# Patient Record
Sex: FEMALE | Race: BLACK OR AFRICAN AMERICAN | NOT HISPANIC OR LATINO | Employment: OTHER | URBAN - METROPOLITAN AREA
[De-identification: names, ages, dates, MRNs, and addresses within clinical notes are randomized per-mention and may not be internally consistent; named-entity substitution may affect disease eponyms.]

---

## 2017-08-03 ENCOUNTER — OFFICE VISIT (OUTPATIENT)
Dept: HEMATOLOGY/ONCOLOGY | Facility: CLINIC | Age: 57
End: 2017-08-03
Payer: COMMERCIAL

## 2017-08-03 VITALS
RESPIRATION RATE: 20 BRPM | DIASTOLIC BLOOD PRESSURE: 73 MMHG | TEMPERATURE: 98 F | HEART RATE: 72 BPM | WEIGHT: 223 LBS | SYSTOLIC BLOOD PRESSURE: 129 MMHG

## 2017-08-03 DIAGNOSIS — D50.0 CHRONIC BLOOD LOSS ANEMIA: ICD-10-CM

## 2017-08-03 DIAGNOSIS — I63.9 CEREBROVASCULAR ACCIDENT (CVA), UNSPECIFIED MECHANISM: ICD-10-CM

## 2017-08-03 DIAGNOSIS — I82.402 RECURRENT DEEP VEIN THROMBOSIS (DVT) OF LEFT LOWER EXTREMITY: ICD-10-CM

## 2017-08-03 DIAGNOSIS — D56.3 THALASSEMIA TRAIT, ALPHA: ICD-10-CM

## 2017-08-03 DIAGNOSIS — R76.0 ANTICARDIOLIPIN ANTIBODY POSITIVE: ICD-10-CM

## 2017-08-03 PROCEDURE — 99214 OFFICE O/P EST MOD 30 MIN: CPT | Mod: ,,, | Performed by: INTERNAL MEDICINE

## 2017-08-03 RX ORDER — CYANOCOBALAMIN (VITAMIN B-12) 250 MCG
250 TABLET ORAL
COMMUNITY

## 2017-08-03 RX ORDER — LEVOTHYROXINE SODIUM 137 UG/1
1 CAPSULE ORAL
COMMUNITY

## 2017-08-03 RX ORDER — METOPROLOL SUCCINATE 25 MG/1
25 TABLET, EXTENDED RELEASE ORAL
COMMUNITY

## 2017-08-03 RX ORDER — LEVOTHYROXINE SODIUM 150 UG/1
TABLET ORAL
Refills: 1 | COMMUNITY
Start: 2017-06-10

## 2017-08-03 RX ORDER — LEVOTHYROXINE SODIUM 137 UG/1
TABLET ORAL
Refills: 1 | COMMUNITY
Start: 2017-05-30

## 2017-08-03 RX ORDER — LISINOPRIL 5 MG/1
10 TABLET ORAL
COMMUNITY

## 2017-08-03 RX ORDER — CALCITRIOL 0.5 UG/1
0.5 CAPSULE ORAL
COMMUNITY

## 2017-08-03 RX ORDER — ESCITALOPRAM OXALATE 20 MG/1
20 TABLET ORAL
COMMUNITY

## 2017-08-03 RX ORDER — TIZANIDINE 4 MG/1
TABLET ORAL
Refills: 5 | COMMUNITY
Start: 2017-05-30 | End: 2019-11-27

## 2017-08-03 RX ORDER — ROSUVASTATIN CALCIUM 20 MG/1
20 TABLET, COATED ORAL
COMMUNITY

## 2017-08-04 PROBLEM — R76.0 ANTICARDIOLIPIN ANTIBODY POSITIVE: Status: ACTIVE | Noted: 2017-08-04

## 2017-08-04 PROBLEM — I63.9 CVA (CEREBRAL VASCULAR ACCIDENT): Status: ACTIVE | Noted: 2017-08-04

## 2017-08-04 PROBLEM — I82.402 RECURRENT DEEP VEIN THROMBOSIS (DVT) OF LEFT LOWER EXTREMITY: Status: ACTIVE | Noted: 2017-08-04

## 2017-08-04 PROBLEM — D56.3 THALASSEMIA TRAIT, ALPHA: Status: ACTIVE | Noted: 2017-08-04

## 2017-08-04 PROBLEM — D50.0 CHRONIC BLOOD LOSS ANEMIA: Status: ACTIVE | Noted: 2017-08-04

## 2017-08-04 NOTE — PROGRESS NOTES
[facility] History & Physical    Subjective:      Patient ID:   Juana Lazo  57 y.o. female  1960  John Tapia MD      Chief Complaint:   Anemia      HPI:  57 y.o. female with diagnosis of right sided CVA in 2015, she has residual left leg weakness and ambulates with a fall curve.    She has history of left leg DVT ×2. In the past proteins C level was low at 50, she had significant proteinuria, and her anti-cardiolipin antibody was increased supporting hypercoagulation syndrome. She is presently on Xarelto 20 mg by mouth daily and aspirin 81 mg by mouth daily without recent stroke or TIA.    She has history of iron deficiency and has been on oral iron in the past. Oral iron constipated her. Hemoglobin is stable at 11.5. Ferritin is normal at 134, she is also oral iron now.    She also has history of alpha thalassemia.She was due to see Dr. Monge for GI evaluation. However she has not seen him,and has decided to put this off, for now.    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 She is allergic to latex andrubber.    She has history of hypertension, hypercholesterolemia, thyroid disease, DJD,she is status post CVA ×1, and DVT at left leg ×2.    She has history of thyroidectomy in 2005, she is status post partial hysterectomy, and left and right knee replacement. She is a  0 para 0 miscarriage 0 individual.      Her mother has hypertension, diabetes, hypercholesterolemia, and anemia. Her dad had thyroid disease, and DVT and cardiac arrhythmia. A sister with thyroid disease.            ROS:   GEN: normal without any fever, night sweats or weight loss  HEENT: see history of present illness.   no HA's, sore throat, stiff neck, changes in vision  CV: normal with no CP, SOB, PND, CEDEÑO or orthopnea  PULM: normal with no SOB, cough, hemoptysis, sputum or pleuritic pain  GI: normal with no abdominal pain, nausea, vomiting,  constipation, diarrhea, melanotic stools, BRBPR, or hematemesis  : normal with no hematuria, dysuria  BREAST: normal with no mass, discharge, pain  SKIN: normal with no rash, erythema, bruising, or swelling     Past Medical History:   Diagnosis Date    Anemia     Stroke      Past Surgical History:   Procedure Laterality Date    HYSTERECTOMY      KNEE SURGERY         Review of patient's allergies indicates:   Allergen Reactions    Latex, natural rubber      Social History     Social History    Marital status:      Spouse name: N/A    Number of children: N/A    Years of education: N/A     Occupational History    Not on file.     Social History Main Topics    Smoking status: Never Smoker    Smokeless tobacco: Never Used    Alcohol use Yes      Comment: occassional    Drug use: No    Sexual activity: Not on file     Other Topics Concern    Not on file     Social History Narrative    No narrative on file         Current Outpatient Prescriptions:     aspirin-calcium carbonate 81 mg-300 mg calcium(777 mg) Tab, Take 81 mg by mouth., Disp: , Rfl:     calcitRIOL (ROCALTROL) 0.5 MCG Cap, Take 0.5 mcg by mouth., Disp: , Rfl:     cyanocobalamin (VITAMIN B-12) 250 MCG tablet, Take 250 mcg by mouth., Disp: , Rfl:     escitalopram oxalate (LEXAPRO) 20 MG tablet, Take 20 mg by mouth., Disp: , Rfl:     levothyroxine (SYNTHROID) 137 MCG Tab tablet, , Disp: , Rfl: 1    levothyroxine 137 mcg Cap, Take 1 capsule by mouth., Disp: , Rfl:     lisinopril (PRINIVIL,ZESTRIL) 5 MG tablet, Take 5 mg by mouth., Disp: , Rfl:     metoprolol succinate (TOPROL-XL) 25 MG 24 hr tablet, Take 25 mg by mouth., Disp: , Rfl:     rivaroxaban (XARELTO) 20 mg Tab, Take 20 mg by mouth., Disp: , Rfl:     rosuvastatin (CRESTOR) 20 MG tablet, Take 20 mg by mouth., Disp: , Rfl:     tizanidine (ZANAFLEX) 4 MG tablet, , Disp: , Rfl: 5    levothyroxine (SYNTHROID) 150 MCG tablet, , Disp: , Rfl: 1          Objective:    Vitals:  Blood pressure 129/73, pulse 72, temperature 97.8 °F (36.6 °C), resp. rate 20, weight 101.2 kg (223 lb).    Physical Examination:   GEN: no apparent distress, comfortable; AAOx3  HEAD: atraumatic and normocephalic  EYES: no pallor, no icterus, PERRLA  ENT: OMM, no pharyngeal erythema, external ears WNL; no nasal discharge; no thrush  NECK: no masses, thyroid normal, trachea midline, no LAD/LN's, supple  CV: RRR with no murmur; normal pulse; normal S1 and S2; no pedal edema  CHEST: Normal respiratory effort; CTAB; normal breath sounds; no wheeze or crackles  ABDOM: nontender and nondistended; soft; normal bowel sounds; no rebound/guarding  MUSC/Skeletal: She has right leg weakness.she is able to ambulate with a shuffling gait with the aid of a walker.  EXTREM: no clubbing, cyanosis, inflammation or swelling  SKIN: no rashes, lesions, ulcers, petechiae   : no telles  NEURO: she has residual right-sided weakness.  She is able to ambulate with a shuffling gait, with the aid of a walker  PSYCH: normal mood, affect and behavior  LYMPH: normal cervical, supraclavicular, axillary and groin LN's      Labs:   No results found for: WBC, HGB, HCT, MCV, PLT CMP  No results found for: NA, K, CL, CO2, GLU, BUN, CREATININE, CALCIUM, PROT, ALBUMIN, BILITOT, ALKPHOS, AST, ALT, ANIONGAP, ESTGFRAFRICA, EGFRNONAA  I have reviewed all available lab results and radiology reports.    Radiology/Diagnostic Studies:        All lab results and imaging results have been reviewed and discussed with the patient    Assessment:   (1) 57 y.o. female with diagnosis of right sided CVA, recurrent left leg DVT ×2, borderline low protein C with proteinuria andincreased anti-cardiolipin antibodies. She has hypercoagulation syndrome. She appears stable on Xarelto 20 mg by mouth daily and aspirin 81 mg by mouth daily.return to clinic in 6 months.    (2)She. has history of iron deficiency, she is off iron now, at this time.    (3)she has history  of alpha thalassemia.          1. Chronic blood loss anemia    2. Cerebrovascular accident (CVA), unspecified mechanism    3. Thalassemia trait, alpha    4. Recurrent deep vein thrombosis (DVT) of left lower extremity    5. Anticardiolipin antibody positive        Plan:       Chronic blood loss anemia    Cerebrovascular accident (CVA), unspecified mechanism    Thalassemia trait, alpha    Recurrent deep vein thrombosis (DVT) of left lower extremity    Anticardiolipin antibody positive        I have explained and the patient understands all of  the current recommendation(s). I have answered all of their questions to the best of my ability and to their complete satisfaction.     20 minutes were spent interviewing, examining, and going oversummary of medical situation with her.  20 minutes was spent placing a summary of the medical history from the paper chart into the medical record and a summary of the interview, physical examination and summary of CVA event, DVT event, and hypercoagulation syndrome findings into the electronic medical record.  Total time spent on patient care with this visit was 40 minutes

## 2018-02-21 LAB
BASOPHILS # BLD AUTO: 29 CELLS/UL (ref 0–200)
BASOPHILS NFR BLD AUTO: 0.6 %
EOSINOPHIL # BLD AUTO: 58 CELLS/UL (ref 15–500)
EOSINOPHIL NFR BLD AUTO: 1.2 %
ERYTHROCYTE [DISTWIDTH] IN BLOOD BY AUTOMATED COUNT: 13.5 % (ref 11–15)
FERRITIN SERPL-MCNC: 89 NG/ML (ref 10–232)
HCT VFR BLD AUTO: 32.9 % (ref 35–45)
HGB BLD-MCNC: 10.4 G/DL (ref 11.7–15.5)
LYMPHOCYTES # BLD AUTO: 1901 CELLS/UL (ref 850–3900)
LYMPHOCYTES NFR BLD AUTO: 39.6 %
MCH RBC QN AUTO: 25.1 PG (ref 27–33)
MCHC RBC AUTO-ENTMCNC: 31.6 G/DL (ref 32–36)
MCV RBC AUTO: 79.5 FL (ref 80–100)
MONOCYTES # BLD AUTO: 418 CELLS/UL (ref 200–950)
MONOCYTES NFR BLD AUTO: 8.7 %
NEUTROPHILS # BLD AUTO: 2395 CELLS/UL (ref 1500–7800)
NEUTROPHILS NFR BLD AUTO: 49.9 %
PLATELET # BLD AUTO: 247 THOUSAND/UL (ref 140–400)
PMV BLD REES-ECKER: 10.8 FL (ref 7.5–12.5)
RBC # BLD AUTO: 4.14 MILLION/UL (ref 3.8–5.1)
WBC # BLD AUTO: 4.8 THOUSAND/UL (ref 3.8–10.8)

## 2018-02-28 ENCOUNTER — OFFICE VISIT (OUTPATIENT)
Dept: HEMATOLOGY/ONCOLOGY | Facility: CLINIC | Age: 58
End: 2018-02-28
Payer: MEDICARE

## 2018-02-28 VITALS
WEIGHT: 254 LBS | TEMPERATURE: 98 F | RESPIRATION RATE: 20 BRPM | SYSTOLIC BLOOD PRESSURE: 174 MMHG | HEART RATE: 61 BPM | DIASTOLIC BLOOD PRESSURE: 77 MMHG

## 2018-02-28 DIAGNOSIS — I82.402 RECURRENT DEEP VEIN THROMBOSIS (DVT) OF LEFT LOWER EXTREMITY: ICD-10-CM

## 2018-02-28 DIAGNOSIS — D56.3 THALASSEMIA TRAIT, ALPHA: ICD-10-CM

## 2018-02-28 DIAGNOSIS — D50.0 CHRONIC BLOOD LOSS ANEMIA: ICD-10-CM

## 2018-02-28 DIAGNOSIS — R76.0 ANTICARDIOLIPIN ANTIBODY POSITIVE: ICD-10-CM

## 2018-02-28 DIAGNOSIS — I63.89 CEREBROVASCULAR ACCIDENT (CVA) DUE TO OTHER MECHANISM: Primary | ICD-10-CM

## 2018-02-28 PROCEDURE — 99214 OFFICE O/P EST MOD 30 MIN: CPT | Mod: ,,, | Performed by: INTERNAL MEDICINE

## 2018-02-28 NOTE — PROGRESS NOTES
[facility] History & Physical    Subjective:      Patient ID:   Juana Lazo  57 y.o. female  1960  John Tapia MD      Chief Complaint:   thal hx    HPI:  57 y.o. female with diagnosis of right sided CVA in 2015, she has residual left leg weakness and ambulates with a fall curve.    She has history of left leg DVT ×2. In the past proteins C level was low at 50, she had significant proteinuria, and her anti-cardiolipin antibody was increased supporting hypercoagulation syndrome. She is presently on Xarelto 20 mg by mouth daily and aspirin 81 mg by mouth daily without recent stroke or TIA.    She has history of iron deficiency and has been on oral iron in the past. Oral iron constipated her. Hemoglobin is stable at 11.5. Ferritin is normal at 134, she is also oral iron now.    She also has history of alpha thalassemia.  She saw Dr. Monge, colonoscopy, polyps removed 2018.    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 She is allergic to latex andrubber.    She has history of hypertension, hypercholesterolemia, thyroid disease, DJD,she is status post CVA ×1, and DVT at left leg ×2.    She has history of thyroidectomy in 2005, she is status post partial hysterectomy, and left and right knee replacement. She is a  0 para 0 miscarriage 0 individual.    Ambulation with walker is better, she is on PT.  Took bus trip with group to [facility], [place], [place].  Did fine.      Her mother has hypertension, diabetes, hypercholesterolemia, and anemia. Her dad had thyroid disease, and DVT and cardiac arrhythmia. A sister with thyroid disease.      ROS:   GEN: normal without any fever, night sweats or weight loss  HEENT: see history of present illness.   no HA's, sore throat, stiff neck, changes in vision  CV: normal with no CP, SOB, PND, CEDEÑO or orthopnea  PULM: normal with no SOB, cough, hemoptysis, sputum or pleuritic  pain  GI: normal with no abdominal pain, nausea, vomiting, constipation, diarrhea, melanotic stools, BRBPR, or hematemesis  : normal with no hematuria, dysuria  BREAST: normal with no mass, discharge, pain  SKIN: normal with no rash, erythema, bruising, or swelling     Past Medical History:   Diagnosis Date    Anemia     Stroke      Past Surgical History:   Procedure Laterality Date    HYSTERECTOMY      KNEE SURGERY         Review of patient's allergies indicates:   Allergen Reactions    Latex, natural rubber      Social History     Social History    Marital status:      Spouse name: N/A    Number of children: N/A    Years of education: N/A     Occupational History    Not on file.     Social History Main Topics    Smoking status: Never Smoker    Smokeless tobacco: Never Used    Alcohol use Yes      Comment: occassional    Drug use: No    Sexual activity: Not on file     Other Topics Concern    Not on file     Social History Narrative    No narrative on file         Current Outpatient Prescriptions:     aspirin-calcium carbonate 81 mg-300 mg calcium(777 mg) Tab, Take 81 mg by mouth., Disp: , Rfl:     calcitRIOL (ROCALTROL) 0.5 MCG Cap, Take 0.5 mcg by mouth., Disp: , Rfl:     cyanocobalamin (VITAMIN B-12) 250 MCG tablet, Take 250 mcg by mouth., Disp: , Rfl:     escitalopram oxalate (LEXAPRO) 20 MG tablet, Take 20 mg by mouth., Disp: , Rfl:     levothyroxine (SYNTHROID) 137 MCG Tab tablet, , Disp: , Rfl: 1    levothyroxine (SYNTHROID) 150 MCG tablet, , Disp: , Rfl: 1    levothyroxine 137 mcg Cap, Take 1 capsule by mouth., Disp: , Rfl:     lisinopril (PRINIVIL,ZESTRIL) 5 MG tablet, Take 5 mg by mouth., Disp: , Rfl:     metoprolol succinate (TOPROL-XL) 25 MG 24 hr tablet, Take 25 mg by mouth., Disp: , Rfl:     rivaroxaban (XARELTO) 20 mg Tab, Take 20 mg by mouth., Disp: , Rfl:     rosuvastatin (CRESTOR) 20 MG tablet, Take 20 mg by mouth., Disp: , Rfl:     tizanidine (ZANAFLEX) 4 MG  tablet, , Disp: , Rfl: 5          Objective:   Vitals:  There were no vitals taken for this visit.    Physical Examination:   GEN: no apparent distress, comfortable  HEAD: atraumatic and normocephalic  EYES: no pallor, no icterus  ENT:  no pharyngeal erythema, external ears WNL; no nasal discharge  NECK: no masses, thyroid normal, trachea midline, no LAD/LN's, supple  CV: RRR with no murmur; normal pulse; normal S1 and S2; no pedal edema  CHEST: Normal respiratory effort; CTAB; normal breath sounds; no wheeze or crackles  ABDOM: nontender and nondistended; soft; normal bowel sounds; no rebound/guarding  MUSC/Skeletal: She has right leg weakness.she is able to ambulate with a shuffling gait with the aid of a walker.  EXTREM: no clubbing, cyanosis, inflammation or swelling  SKIN: no rashes, lesions, ulcers, petechiae   : no telles  NEURO: she has residual right-sided weakness.  She is able to ambulate with a shuffling gait, with the aid of a walker  PSYCH: normal mood, affect and behavior  LYMPH: normal cervical, supraclavicular, axillary and groin LN's      Labs:   Lab Results   Component Value Date    WBC 4.8 02/20/2018    HGB 10.4 (L) 02/20/2018    HCT 32.9 (L) 02/20/2018    MCV 79.5 (L) 02/20/2018     02/20/2018    .    Radiology/Diagnostic Studies:    Mamm 9/20/17 negative for malignancy  Ferritin 134.  Hgb elect. Supports thal trait.        Assessment:   (1) 57 y.o. female with diagnosis of right sided CVA, recurrent left leg DVT ×2, borderline low protein C with proteinuria andincreased anti-cardiolipin antibodies. She has hypercoagulation syndrome. She appears stable on Xarelto 20 mg by mouth daily and aspirin 81 mg by mouth daily.return to clinic in 6 months.    (2)She. has history of iron deficiency, she is off iron now, at this time.    (3)she has history of alpha thalassemia.

## 2018-08-27 ENCOUNTER — TELEPHONE (OUTPATIENT)
Dept: HEMATOLOGY/ONCOLOGY | Facility: CLINIC | Age: 58
End: 2018-08-27

## 2018-08-27 DIAGNOSIS — D50.0 ANEMIA DUE TO CHRONIC BLOOD LOSS: Primary | ICD-10-CM

## 2018-08-29 ENCOUNTER — OFFICE VISIT (OUTPATIENT)
Dept: HEMATOLOGY/ONCOLOGY | Facility: CLINIC | Age: 58
End: 2018-08-29
Payer: MEDICARE

## 2018-08-29 VITALS
SYSTOLIC BLOOD PRESSURE: 122 MMHG | BODY MASS INDEX: 46.74 KG/M2 | TEMPERATURE: 98 F | HEART RATE: 72 BPM | DIASTOLIC BLOOD PRESSURE: 74 MMHG | HEIGHT: 62 IN | WEIGHT: 254 LBS

## 2018-08-29 DIAGNOSIS — R76.0 ANTICARDIOLIPIN ANTIBODY POSITIVE: Primary | ICD-10-CM

## 2018-08-29 DIAGNOSIS — D56.3 THALASSEMIA ALPHA CARRIER: ICD-10-CM

## 2018-08-29 DIAGNOSIS — I63.89: ICD-10-CM

## 2018-08-29 LAB
BASOPHILS # BLD AUTO: 32 CELLS/UL (ref 0–200)
BASOPHILS NFR BLD AUTO: 0.6 %
EOSINOPHIL # BLD AUTO: 111 CELLS/UL (ref 15–500)
EOSINOPHIL NFR BLD AUTO: 2.1 %
ERYTHROCYTE [DISTWIDTH] IN BLOOD BY AUTOMATED COUNT: 14.1 % (ref 11–15)
FERRITIN SERPL-MCNC: 112 NG/ML (ref 10–232)
HCT VFR BLD AUTO: 33.5 % (ref 35–45)
HGB BLD-MCNC: 10.9 G/DL (ref 11.7–15.5)
LYMPHOCYTES # BLD AUTO: 2062 CELLS/UL (ref 850–3900)
LYMPHOCYTES NFR BLD AUTO: 38.9 %
MCH RBC QN AUTO: 25.5 PG (ref 27–33)
MCHC RBC AUTO-ENTMCNC: 32.5 G/DL (ref 32–36)
MCV RBC AUTO: 78.5 FL (ref 80–100)
MONOCYTES # BLD AUTO: 334 CELLS/UL (ref 200–950)
MONOCYTES NFR BLD AUTO: 6.3 %
NEUTROPHILS # BLD AUTO: 2761 CELLS/UL (ref 1500–7800)
NEUTROPHILS NFR BLD AUTO: 52.1 %
PLATELET # BLD AUTO: 246 THOUSAND/UL (ref 140–400)
PMV BLD REES-ECKER: 10.7 FL (ref 7.5–12.5)
RBC # BLD AUTO: 4.27 MILLION/UL (ref 3.8–5.1)
WBC # BLD AUTO: 5.3 THOUSAND/UL (ref 3.8–10.8)

## 2018-08-29 PROCEDURE — 99214 OFFICE O/P EST MOD 30 MIN: CPT | Mod: ,,, | Performed by: INTERNAL MEDICINE

## 2018-08-29 NOTE — LETTER
August 29, 2018      John Tapia MD           [facility]  [address] [address]  [place] [address]  Phone: [phone]  Fax: [phone]          Patient: Juana Lazo   MR Number: [id number]   YOB: 1960   Date of Visit: 8/29/2018       Dear Dr. John Tapia:    Thank you for referring Juana Lazo to me for evaluation. Attached you will find relevant portions of my assessment and plan of care.    If you have questions, please do not hesitate to call me. I look forward to following Juana Lazo along with you.    Sincerely,    RADHA Sweeney MD    Enclosure  CC:  No Recipients    If you would like to receive this communication electronically, please contact [email or web address] or [phone] to request more information on [employer] Link access.    For providers and/or their staff who would like to refer a patient to [facility], please contact us through our one-stop-shop provider referral line, [facility] , at [phone].    If you feel you have received this communication in error or would no longer like to receive these types of communications, please e-mail [email or web address]

## 2018-08-29 NOTE — PROGRESS NOTES
[facility] History & Physical    Subjective:      Patient ID:   Juana Lazo  58 y.o. female  1960  John Tapia MD      Chief Complaint:   thal hx    HPI:  58 y.o. female with diagnosis of right sided CVA in 2015, she has residual left leg weakness and ambulates with a walker.  On PT BIW.  On Xarelto 20mg, ASA 81mg daily.  No further clot events or CVA.    She has history of left leg DVT ×2. In the past proteins C level was low at 50, she had significant proteinuria, and her anti-cardiolipin antibody was increased supporting hypercoagulation syndrome. She is presently on Xarelto 20 mg by mouth daily and aspirin 81 mg by mouth daily without recent stroke or TIA.    She has history of iron deficiency and has been on oral iron in the past. Oral iron constipated her.  She is also off oral iron now.  Recent labs show Hgb 10.9 and ferritin 111.    She also has history of alpha thalassemia.  She saw Dr. Monge, colonoscopy, polyps removed 2018.    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 She is allergic to latex andrubber.    She has history of hypertension, hypercholesterolemia, thyroid disease, DJD,she is status post CVA ×1, and DVT at left leg ×2.    She has history of thyroidectomy in 2005, she is status post partial hysterectomy, and left and right knee replacement. She is a  0 para 0 miscarriage 0 individual.    Ambulation with walker is better, she is on PT.  Took bus trip with group to [employer], [place], [place].  Did fine.      Her mother has hypertension, diabetes, hypercholesterolemia, and anemia. Her dad had thyroid disease, and DVT and cardiac arrhythmia. A sister with thyroid disease.      ROS:   GEN: normal without any fever, night sweats or weight loss  HEENT: see history of present illness.   no HA's, sore throat, stiff neck, changes in vision  CV: normal with no CP, SOB, PND, CEDEÑO or  orthopnea  PULM: normal with no SOB, cough, hemoptysis, sputum or pleuritic pain  GI: normal with no abdominal pain, nausea, vomiting, constipation, diarrhea, melanotic stools, BRBPR, or hematemesis  : normal with no hematuria, dysuria  BREAST: normal with no mass, discharge, pain  SKIN: normal with no rash, erythema, bruising, or swelling     Past Medical History:   Diagnosis Date    Anemia     Stroke      Past Surgical History:   Procedure Laterality Date    HYSTERECTOMY      KNEE SURGERY         Review of patient's allergies indicates:   Allergen Reactions    Latex, natural rubber      Social History     Socioeconomic History    Marital status:      Spouse name: Not on file    Number of children: Not on file    Years of education: Not on file    Highest education level: Not on file   Social Needs    Financial resource strain: Not on file    Food insecurity - worry: Not on file    Food insecurity - inability: Not on file    Transportation needs - medical: Not on file    Transportation needs - non-medical: Not on file   Occupational History    Not on file   Tobacco Use    Smoking status: Never Smoker    Smokeless tobacco: Never Used   Substance and Sexual Activity    Alcohol use: Yes     Comment: occassional    Drug use: No    Sexual activity: Not on file   Other Topics Concern    Not on file   Social History Narrative    Not on file         Current Outpatient Medications:     aspirin-calcium carbonate 81 mg-300 mg calcium(777 mg) Tab, Take 81 mg by mouth., Disp: , Rfl:     calcitRIOL (ROCALTROL) 0.5 MCG Cap, Take 0.5 mcg by mouth., Disp: , Rfl:     cyanocobalamin (VITAMIN B-12) 250 MCG tablet, Take 250 mcg by mouth., Disp: , Rfl:     escitalopram oxalate (LEXAPRO) 20 MG tablet, Take 20 mg by mouth., Disp: , Rfl:     levothyroxine (SYNTHROID) 137 MCG Tab tablet, , Disp: , Rfl: 1    levothyroxine (SYNTHROID) 150 MCG tablet, , Disp: , Rfl: 1    levothyroxine 137 mcg Cap, Take 1  "capsule by mouth., Disp: , Rfl:     lisinopril (PRINIVIL,ZESTRIL) 5 MG tablet, Take 10 mg by mouth. , Disp: , Rfl:     metoprolol succinate (TOPROL-XL) 25 MG 24 hr tablet, Take 25 mg by mouth., Disp: , Rfl:     rivaroxaban (XARELTO) 20 mg Tab, Take 20 mg by mouth., Disp: , Rfl:     rosuvastatin (CRESTOR) 20 MG tablet, Take 20 mg by mouth., Disp: , Rfl:     tizanidine (ZANAFLEX) 4 MG tablet, , Disp: , Rfl: 5          Objective:   Vitals:  Blood pressure 122/74, pulse 72, temperature 98.4 °F (36.9 °C), height 5' 2" (1.575 m), weight 115.2 kg (254 lb).    Physical Examination:   GEN: no apparent distress, comfortable  HEAD: atraumatic and normocephalic  EYES: no pallor, no icterus  ENT:  no pharyngeal erythema, external ears WNL; no nasal discharge  NECK: no masses, thyroid normal, trachea midline, no LAD/LN's, supple  CV: RRR with no murmur; normal pulse; normal S1 and S2; no pedal edema  CHEST: Normal respiratory effort; CTAB; normal breath sounds; no wheeze or crackles  ABDOM: nontender and nondistended; soft; normal bowel sounds; no rebound/guarding  MUSC/Skeletal: She has right leg weakness.she is able to ambulate with a shuffling gait with the aid of a walker.  EXTREM: no clubbing, cyanosis, inflammation or swelling  SKIN: no rashes, lesions, ulcers, petechiae   : no telles  NEURO: she has residual right-sided weakness.  She is able to ambulate with a shuffling gait, with the aid of a walker  PSYCH: normal mood, affect and behavior  LYMPH: normal cervical, supraclavicular, axillary and groin LN's      Labs:   Lab Results   Component Value Date    WBC 5.3 08/28/2018    HGB 10.9 (L) 08/28/2018    HCT 33.5 (L) 08/28/2018    MCV 78.5 (L) 08/28/2018     08/28/2018    .    Radiology/Diagnostic Studies:    Mamm 9/20/17 negative for malignancy  Ferritin 111.  Hgb 10.9  Hgb elect. Supports thal trait.        Assessment:   (1) 58 y.o. female with diagnosis of right sided CVA, recurrent left leg DVT ×2, " borderline low protein C with proteinuria and increased anti-cardiolipin antibodies. She has hypercoagulation syndrome. She appears stable on Xarelto 20 mg by mouth daily and aspirin 81 mg by mouth daily.  Return to clinic in 6 months.    (2)She. has history of iron deficiency, she is off iron now, at this time.  Iron stores are NL now.    (3)she has history of alpha thalassemia.    Lupus anticoagulant, Anti cardiolipin Antibody, CBC Dr. Tapia 2/2019.    RTC 2/2019.

## 2019-04-29 ENCOUNTER — TELEPHONE (OUTPATIENT)
Dept: HEMATOLOGY/ONCOLOGY | Facility: CLINIC | Age: 59
End: 2019-04-29

## 2019-04-29 DIAGNOSIS — D51.8 ANEMIA OF DECREASED VITAMIN B12 ABSORPTION: Primary | ICD-10-CM

## 2019-04-30 LAB
BASOPHILS # BLD AUTO: 32 CELLS/UL (ref 0–200)
BASOPHILS NFR BLD AUTO: 0.6 %
EOSINOPHIL # BLD AUTO: 101 CELLS/UL (ref 15–500)
EOSINOPHIL NFR BLD AUTO: 1.9 %
ERYTHROCYTE [DISTWIDTH] IN BLOOD BY AUTOMATED COUNT: 13.8 % (ref 11–15)
FERRITIN SERPL-MCNC: 94 NG/ML (ref 10–232)
HCT VFR BLD AUTO: 35.6 % (ref 35–45)
HGB BLD-MCNC: 10.9 G/DL (ref 11.7–15.5)
LYMPHOCYTES # BLD AUTO: 1829 CELLS/UL (ref 850–3900)
LYMPHOCYTES NFR BLD AUTO: 34.5 %
MCH RBC QN AUTO: 24.7 PG (ref 27–33)
MCHC RBC AUTO-ENTMCNC: 30.6 G/DL (ref 32–36)
MCV RBC AUTO: 80.7 FL (ref 80–100)
MONOCYTES # BLD AUTO: 318 CELLS/UL (ref 200–950)
MONOCYTES NFR BLD AUTO: 6 %
NEUTROPHILS # BLD AUTO: 3021 CELLS/UL (ref 1500–7800)
NEUTROPHILS NFR BLD AUTO: 57 %
PLATELET # BLD AUTO: 276 THOUSAND/UL (ref 140–400)
PMV BLD REES-ECKER: 10.8 FL (ref 7.5–12.5)
RBC # BLD AUTO: 4.41 MILLION/UL (ref 3.8–5.1)
WBC # BLD AUTO: 5.3 THOUSAND/UL (ref 3.8–10.8)

## 2019-05-02 ENCOUNTER — OFFICE VISIT (OUTPATIENT)
Dept: HEMATOLOGY/ONCOLOGY | Facility: CLINIC | Age: 59
End: 2019-05-02
Payer: MEDICARE

## 2019-05-02 VITALS
HEART RATE: 75 BPM | WEIGHT: 261 LBS | DIASTOLIC BLOOD PRESSURE: 65 MMHG | TEMPERATURE: 98 F | BODY MASS INDEX: 47.74 KG/M2 | RESPIRATION RATE: 20 BRPM | SYSTOLIC BLOOD PRESSURE: 131 MMHG

## 2019-05-02 DIAGNOSIS — D56.3 THALASSEMIA ALPHA CARRIER: Primary | ICD-10-CM

## 2019-05-02 PROCEDURE — 99214 PR OFFICE/OUTPT VISIT, EST, LEVL IV, 30-39 MIN: ICD-10-PCS | Mod: ,,, | Performed by: INTERNAL MEDICINE

## 2019-05-02 PROCEDURE — 99214 OFFICE O/P EST MOD 30 MIN: CPT | Mod: ,,, | Performed by: INTERNAL MEDICINE

## 2019-05-02 NOTE — LETTER
May 5, 2019      John Tapia MD  [address]  [address]  [place] [address]           [facility] Hematology Oncology  [address]  [place] [address]  Phone: [phone]  Fax: [phone]          Patient: Juana Lazo   MR Number: [id number]   YOB: 1960   Date of Visit: 5/2/2019       Dear Dr. John Tapia:    Thank you for referring Juana Lazo to me for evaluation. Attached you will find relevant portions of my assessment and plan of care.    If you have questions, please do not hesitate to call me. I look forward to following Juana Lazo along with you.    Sincerely,    RADHA Sweeney MD    Enclosure  CC:  No Recipients    If you would like to receive this communication electronically, please contact [email or web address] or [phone] to request more information on [employer] Link access.    For providers and/or their staff who would like to refer a patient to [facility], please contact us through our one-stop-shop provider referral line, [facility], at [phone].    If you feel you have received this communication in error or would no longer like to receive these types of communications, please e-mail [email or web address]

## 2019-05-06 NOTE — PROGRESS NOTES
[facility] History & Physical    Subjective:      Patient ID:   Juana Lazo  58 y.o. female  1960  John Tapia MD      Chief Complaint:   thal hx    HPI:  58 y.o. female with diagnosis of right sided CVA in 2015, she has residual left leg weakness and ambulates with a walker.  On PT BIW.  Had L leg DVT.  On Xarelto 20mg, ASA 81mg daily.  No further clot events or CVA.    She has history of left leg DVT ×2. In the past proteins C level was low at 50, she had significant proteinuria, and her anti-cardiolipin antibody was increased supporting hypercoagulation syndrome. She is presently on Xarelto 20 mg by mouth daily and aspirin 81 mg by mouth daily without recent stroke or TIA.    She has history of iron deficiency and has been on oral iron in the past. Oral iron constipated her.  She is also off oral iron now.  Recent labs show Hgb 10.9 and ferritin 94.    She also has history of alpha thalassemia.  She saw Dr. Monge, colonoscopy, polyps removed 2018.    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 She is allergic to latex andrubber.    She has history of hypertension, hypercholesterolemia, thyroid disease, DJD,she is status post CVA ×1, and DVT at left leg ×2.    She has history of thyroidectomy in 2005, she is status post partial hysterectomy, and left and right knee replacement. She is a  0 para 0 miscarriage 0 individual.    Ambulation with walker is better, she is on PT.  Took bus trip with group to [facility], [place], [place].  Did fine.    Dr. Gomez to see her for ankle sx.      Her mother has hypertension, diabetes, hypercholesterolemia, and anemia. Her dad had thyroid disease, and DVT and cardiac arrhythmia. A sister with thyroid disease.      ROS:   GEN: normal without any fever, night sweats or weight loss  HEENT: see history of present illness.   no HA's, sore throat, stiff neck, changes in  vision  CV: normal with no CP, SOB, PND, CEDEÑO or orthopnea  PULM: normal with no SOB, cough, hemoptysis, sputum or pleuritic pain  GI: normal with no abdominal pain, nausea, vomiting, constipation, diarrhea, melanotic stools, BRBPR, or hematemesis  : normal with no hematuria, dysuria  BREAST: normal with no mass, discharge, pain  SKIN: normal with no rash, erythema, bruising, or swelling     Past Medical History:   Diagnosis Date    Anemia     Stroke      Past Surgical History:   Procedure Laterality Date    HYSTERECTOMY      KNEE SURGERY         Review of patient's allergies indicates:   Allergen Reactions    Latex, natural rubber      Social History     Socioeconomic History    Marital status:      Spouse name: Not on file    Number of children: Not on file    Years of education: Not on file    Highest education level: Not on file   Occupational History    Not on file   Social Needs    Financial resource strain: Not on file    Food insecurity:     Worry: Not on file     Inability: Not on file    Transportation needs:     Medical: Not on file     Non-medical: Not on file   Tobacco Use    Smoking status: Never Smoker    Smokeless tobacco: Never Used   Substance and Sexual Activity    Alcohol use: Yes     Comment: occassional    Drug use: No    Sexual activity: Not on file   Lifestyle    Physical activity:     Days per week: Not on file     Minutes per session: Not on file    Stress: Not on file   Relationships    Social connections:     Talks on phone: Not on file     Gets together: Not on file     Attends Jain service: Not on file     Active member of club or organization: Not on file     Attends meetings of clubs or organizations: Not on file     Relationship status: Not on file   Other Topics Concern    Not on file   Social History Narrative    Not on file         Current Outpatient Medications:     aspirin-calcium carbonate 81 mg-300 mg calcium(777 mg) Tab, Take 81 mg by  mouth., Disp: , Rfl:     calcitRIOL (ROCALTROL) 0.5 MCG Cap, Take 0.5 mcg by mouth., Disp: , Rfl:     cyanocobalamin (VITAMIN B-12) 250 MCG tablet, Take 250 mcg by mouth., Disp: , Rfl:     escitalopram oxalate (LEXAPRO) 20 MG tablet, Take 20 mg by mouth., Disp: , Rfl:     levothyroxine (SYNTHROID) 137 MCG Tab tablet, , Disp: , Rfl: 1    levothyroxine (SYNTHROID) 150 MCG tablet, , Disp: , Rfl: 1    levothyroxine 137 mcg Cap, Take 1 capsule by mouth., Disp: , Rfl:     lisinopril (PRINIVIL,ZESTRIL) 5 MG tablet, Take 10 mg by mouth. , Disp: , Rfl:     metoprolol succinate (TOPROL-XL) 25 MG 24 hr tablet, Take 25 mg by mouth., Disp: , Rfl:     rivaroxaban (XARELTO) 20 mg Tab, Take 20 mg by mouth., Disp: , Rfl:     rosuvastatin (CRESTOR) 20 MG tablet, Take 20 mg by mouth., Disp: , Rfl:     tizanidine (ZANAFLEX) 4 MG tablet, , Disp: , Rfl: 5          Objective:   Vitals:  Blood pressure 131/65, pulse 75, temperature 98.3 °F (36.8 °C), resp. rate 20, weight 118.4 kg (261 lb).    Physical Examination:   GEN: no apparent distress, comfortable  HEAD: atraumatic and normocephalic  EYES: no pallor, no icterus  ENT:  no pharyngeal erythema, external ears WNL; no nasal discharge  NECK: no masses, thyroid normal, trachea midline, no LAD/LN's, supple  CV: RRR with no murmur; normal pulse; normal S1 and S2; no pedal edema  CHEST: Normal respiratory effort; CTAB; normal breath sounds; no wheeze or crackles  ABDOM: nontender and nondistended; soft; normal bowel sounds; no rebound/guarding  MUSC/Skeletal: She has right leg weakness.she is able to ambulate with a shuffling gait with the aid of a walker.  EXTREM: no clubbing, cyanosis, inflammation or swelling  SKIN: no rashes, lesions, ulcers, petechiae   : no telles  NEURO: she has residual right-sided weakness.  She is able to ambulate with a shuffling gait, with the aid of a walker  PSYCH: normal mood, affect and behavior  LYMPH: normal cervical, supraclavicular, axillary  and groin LN's      Labs:   Lab Results   Component Value Date    WBC 5.3 04/29/2019    HGB 10.9 (L) 04/29/2019    HCT 35.6 04/29/2019    MCV 80.7 04/29/2019     04/29/2019    .    Radiology/Diagnostic Studies:    Mamm 9/20/17 negative for malignancy  Ferritin 94.  Hgb 10.9  Hgb elect. Supports thal trait.        Assessment:   (1) 58 y.o. female with diagnosis of right sided CVA, recurrent left leg DVT ×2, borderline low protein C with proteinuria and increased anti-cardiolipin antibodies. She has hypercoagulation syndrome. She appears stable on Xarelto 20 mg by mouth daily and aspirin 81 mg by mouth daily.  Return to clinic in 6 months.    (2)She. has history of iron deficiency, she is off iron now, at this time.  Iron stores are NL now.    (3)she has history of alpha thalassemia.    RTC 6 months.

## 2019-11-12 ENCOUNTER — TELEPHONE (OUTPATIENT)
Dept: HEMATOLOGY/ONCOLOGY | Facility: CLINIC | Age: 59
End: 2019-11-12

## 2019-11-12 NOTE — TELEPHONE ENCOUNTER
Called to see if patient completed blood work and patient wants to R/S her and her mothers appt. Kelly stated that she will call Mrs rAias tomorrow to reschedule.

## 2019-11-27 ENCOUNTER — OFFICE VISIT (OUTPATIENT)
Dept: HEMATOLOGY/ONCOLOGY | Facility: CLINIC | Age: 59
End: 2019-11-27
Payer: MEDICARE

## 2019-11-27 VITALS
WEIGHT: 263.19 LBS | BODY MASS INDEX: 48.14 KG/M2 | SYSTOLIC BLOOD PRESSURE: 141 MMHG | DIASTOLIC BLOOD PRESSURE: 77 MMHG | TEMPERATURE: 98 F | HEART RATE: 69 BPM | OXYGEN SATURATION: 92 %

## 2019-11-27 DIAGNOSIS — I82.402 RECURRENT DEEP VEIN THROMBOSIS (DVT) OF LEFT LOWER EXTREMITY: Primary | ICD-10-CM

## 2019-11-27 DIAGNOSIS — I63.89 CEREBROVASCULAR ACCIDENT (CVA) DUE TO OTHER MECHANISM: ICD-10-CM

## 2019-11-27 DIAGNOSIS — Z12.31 BREAST CANCER SCREENING BY MAMMOGRAM: ICD-10-CM

## 2019-11-27 DIAGNOSIS — E89.0 POSTOPERATIVE HYPOTHYROIDISM: ICD-10-CM

## 2019-11-27 DIAGNOSIS — D50.0 CHRONIC BLOOD LOSS ANEMIA: ICD-10-CM

## 2019-11-27 PROCEDURE — 99214 OFFICE O/P EST MOD 30 MIN: CPT | Mod: S$GLB,,, | Performed by: INTERNAL MEDICINE

## 2019-11-27 PROCEDURE — 99214 PR OFFICE/OUTPT VISIT, EST, LEVL IV, 30-39 MIN: ICD-10-PCS | Mod: S$GLB,,, | Performed by: INTERNAL MEDICINE

## 2019-11-29 NOTE — PROGRESS NOTES
[facility] History & Physical    Subjective:      Patient ID:   Juana Lazo  59 y.o. female  1960  John Tapia MD      Chief Complaint:   thal hx    HPI:  59 y.o. female with diagnosis of right sided CVA in 2015, she has residual left leg weakness and ambulates with a walker.  On PT BIW.  Had L leg DVT.  On Xarelto 20mg, ASA 81mg daily.  No further clot events or CVA.    She has history of left leg DVT ×2. In the past proteins C level was low at 50, she had significant proteinuria, and her anti-cardiolipin antibody was increased supporting hypercoagulation syndrome. She is presently on Xarelto 20 mg by mouth daily and aspirin 81 mg by mouth daily without recent stroke or TIA.    She has history of iron deficiency and has been on oral iron in the past. Oral iron constipated her.  She is also off oral iron now.  Recent labs show Hgb 10.9 and ferritin 94.    She also has history of alpha thalassemia.  She saw Dr. Monge, colonoscopy, polyps removed 2018.    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 She is allergic to latex andrubber.    She has history of hypertension, hypercholesterolemia, thyroid disease, DJD,she is status post CVA ×1, and DVT at left leg ×2.    She has history of thyroidectomy in 2005, she is status post partial hysterectomy, and left and right knee replacement. She is a  0 para 0 miscarriage 0 individual.    Ambulation with walker is better, she is on PT.  Took bus trip with group to [employer], [place], [place].  Did fine.    Her mother has hypertension, diabetes, hypercholesterolemia, and anemia. Her dad had thyroid disease, and DVT and cardiac arrhythmia. A sister with thyroid disease.    On Xarelto, ASA prophylaxis.  Check CBC, Ferritin, TSH.      ROS:   GEN: normal without any fever, night sweats or weight loss  HEENT: see history of present illness.   no HA's, sore throat, stiff neck,  changes in vision  CV: normal with no CP, SOB, PND, CEDEÑO or orthopnea  PULM: normal with no SOB, cough, hemoptysis, sputum or pleuritic pain  GI: normal with no abdominal pain, nausea, vomiting, constipation, diarrhea, melanotic stools, BRBPR, or hematemesis  : normal with no hematuria, dysuria  BREAST: normal with no mass, discharge, pain  SKIN: normal with no rash, erythema, bruising, or swelling     Past Medical History:   Diagnosis Date    Anemia     Stroke      Past Surgical History:   Procedure Laterality Date    HYSTERECTOMY      KNEE SURGERY         Review of patient's allergies indicates:   Allergen Reactions    Latex, natural rubber            Current Outpatient Medications:     aspirin-calcium carbonate 81 mg-300 mg calcium(777 mg) Tab, Take 81 mg by mouth., Disp: , Rfl:     calcitRIOL (ROCALTROL) 0.5 MCG Cap, Take 0.5 mcg by mouth., Disp: , Rfl:     cyanocobalamin (VITAMIN B-12) 250 MCG tablet, Take 250 mcg by mouth., Disp: , Rfl:     escitalopram oxalate (LEXAPRO) 20 MG tablet, Take 20 mg by mouth., Disp: , Rfl:     levothyroxine (SYNTHROID) 137 MCG Tab tablet, , Disp: , Rfl: 1    levothyroxine (SYNTHROID) 150 MCG tablet, , Disp: , Rfl: 1    levothyroxine 137 mcg Cap, Take 1 capsule by mouth., Disp: , Rfl:     lisinopril (PRINIVIL,ZESTRIL) 5 MG tablet, Take 10 mg by mouth. , Disp: , Rfl:     metoprolol succinate (TOPROL-XL) 25 MG 24 hr tablet, Take 25 mg by mouth., Disp: , Rfl:     rivaroxaban (XARELTO) 20 mg Tab, Take 20 mg by mouth., Disp: , Rfl:     rosuvastatin (CRESTOR) 20 MG tablet, Take 20 mg by mouth., Disp: , Rfl:           Objective:   Vitals:  Blood pressure (!) 141/77, pulse 69, temperature 98.1 °F (36.7 °C), weight 119.4 kg (263 lb 3.2 oz), SpO2 (!) 92 %.    Physical Examination:   GEN: no apparent distress, comfortable  HEAD: atraumatic and normocephalic  EYES: no pallor, no icterus  ENT:  no pharyngeal erythema, external ears WNL; no nasal discharge  NECK: no masses,  thyroid normal, trachea midline, no LAD/LN's, supple  CV: RRR with no murmur; normal pulse; normal S1 and S2; no pedal edema  CHEST: Normal respiratory effort; CTAB; normal breath sounds; no wheeze or crackles  ABDOM: nontender and nondistended; soft; normal bowel sounds; no rebound/guarding, L/S NP  MUSC/Skeletal: She has right leg weakness.she is able to ambulate with a shuffling gait with the aid of a walker.  EXTREM: no clubbing, cyanosis, inflammation or swelling  SKIN: no rashes, lesions, ulcers, petechiae   : no cvat  NEURO: she has residual right-sided weakness.  She is able to ambulate with a shuffling gait, with the aid of a walker  PSYCH: normal mood, affect and behavior  LYMPH: normal cervical, supraclavicular, axillary and groin LN's      Labs:   Lab Results   Component Value Date    WBC 5.3 04/29/2019    HGB 10.9 (L) 04/29/2019    HCT 35.6 04/29/2019    MCV 80.7 04/29/2019     04/29/2019            Assessment:   (1) 59 y.o. female with diagnosis of right sided CVA, recurrent left leg DVT ×2, borderline low protein C with proteinuria and increased anti-cardiolipin antibodies. She has hypercoagulation syndrome. She appears stable on Xarelto 20 mg by mouth daily and aspirin 81 mg by mouth daily.  Return to clinic in 6 months.    (2)She. has history of iron deficiency, she is off iron now, at this time.  Iron stores are NL now.    (3)she has history of alpha thalassemia.    RTC 6 months.

## 2020-05-20 LAB
BASOPHILS # BLD AUTO: 31 CELLS/UL (ref 0–200)
BASOPHILS NFR BLD AUTO: 0.7 %
EOSINOPHIL # BLD AUTO: 62 CELLS/UL (ref 15–500)
EOSINOPHIL NFR BLD AUTO: 1.4 %
ERYTHROCYTE [DISTWIDTH] IN BLOOD BY AUTOMATED COUNT: 14.5 % (ref 11–15)
FERRITIN SERPL-MCNC: 149 NG/ML (ref 16–232)
HCT VFR BLD AUTO: 35.7 % (ref 35–45)
HGB BLD-MCNC: 11.3 G/DL (ref 11.7–15.5)
LYMPHOCYTES # BLD AUTO: 1720 CELLS/UL (ref 850–3900)
LYMPHOCYTES NFR BLD AUTO: 39.1 %
MCH RBC QN AUTO: 25.5 PG (ref 27–33)
MCHC RBC AUTO-ENTMCNC: 31.7 G/DL (ref 32–36)
MCV RBC AUTO: 80.6 FL (ref 80–100)
MONOCYTES # BLD AUTO: 317 CELLS/UL (ref 200–950)
MONOCYTES NFR BLD AUTO: 7.2 %
NEUTROPHILS # BLD AUTO: 2270 CELLS/UL (ref 1500–7800)
NEUTROPHILS NFR BLD AUTO: 51.6 %
PLATELET # BLD AUTO: 258 THOUSAND/UL (ref 140–400)
PMV BLD REES-ECKER: 10.8 FL (ref 7.5–12.5)
RBC # BLD AUTO: 4.43 MILLION/UL (ref 3.8–5.1)
TSH SERPL-ACNC: 0.55 MIU/L (ref 0.4–4.5)
WBC # BLD AUTO: 4.4 THOUSAND/UL (ref 3.8–10.8)

## 2020-05-27 ENCOUNTER — OFFICE VISIT (OUTPATIENT)
Dept: HEMATOLOGY/ONCOLOGY | Facility: CLINIC | Age: 60
End: 2020-05-27
Payer: MEDICARE

## 2020-05-27 DIAGNOSIS — D56.0 ALPHA THALASSEMIA: Primary | ICD-10-CM

## 2020-05-27 PROCEDURE — 99214 PR OFFICE/OUTPT VISIT, EST, LEVL IV, 30-39 MIN: ICD-10-PCS | Mod: 95,,, | Performed by: INTERNAL MEDICINE

## 2020-05-27 PROCEDURE — 99214 OFFICE O/P EST MOD 30 MIN: CPT | Mod: 95,,, | Performed by: INTERNAL MEDICINE

## 2020-05-31 NOTE — PROGRESS NOTES
[facility] hematology oncology telemedicine audio video encounter progress note  May 27, 2020    Patient is in isolation precautions at home because of corona virus epidemic.    Patient acknowledges and accepts the audio video encounter today.  This audio video encounter is in lieu of in-person encounter because of corona virus emergency.    Subjective:      Patient ID:   Juana Lazo  59 y.o. female  1960  John Tapia MD      Chief Complaint:   thal hx    HPI:  59 y.o. female with diagnosis of right sided CVA in 2015, she has residual left leg weakness and ambulates with a walker.  On PT BIW.  Had L leg DVT.  On Xarelto 20mg, ASA 81mg daily.  No further clot events or CVA.    She has history of left leg DVT ×2. In the past proteins C level was low at 50, she had significant proteinuria, and her anti-cardiolipin antibody was increased supporting hypercoagulation syndrome. She is presently on Xarelto 20 mg by mouth daily and aspirin 81 mg by mouth daily without recent stroke or TIA.    She has history of iron deficiency and has been on oral iron in the past. Oral iron constipated her.  She is also off oral iron now.  Recent labs show Hgb 11.3 and ferritin 149.    She also has history of alpha thalassemia.  She saw Dr. Monge, colonoscopy, polyps removed 2018.    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 She is allergic to latex andrubber.    She has history of hypertension, hypercholesterolemia, thyroid disease, DJD,she is status post CVA ×1, and DVT at left leg ×2.    She has history of thyroidectomy in 2005, she is status post partial hysterectomy, and left and right knee replacement. She is a  0 para 0 miscarriage 0 individual.    Ambulation with walker is better, she is on PT.  Took bus trip with group to [place], [place], [place].  Did fine.    Her mother has hypertension, diabetes,  hypercholesterolemia, and anemia. Her dad had thyroid disease, and DVT and cardiac arrhythmia. A sister with thyroid disease.    On Xarelto, ASA prophylaxis.  Check CBC, Ferritin, TSH.      ROS:   GEN: normal without any fever, night sweats or weight loss  HEENT: see history of present illness.   no HA's, sore throat, stiff neck, changes in vision  CV: normal with no CP, SOB, PND, CEDEÑO or orthopnea  PULM: normal with no SOB, cough, hemoptysis, sputum or pleuritic pain  GI: normal with no abdominal pain, nausea, vomiting, constipation, diarrhea, melanotic stools, BRBPR, or hematemesis  : normal with no hematuria, dysuria  BREAST: normal with no mass, discharge, pain  SKIN: normal with no rash, erythema, bruising, or swelling     Past Medical History:   Diagnosis Date    Anemia     Stroke      Past Surgical History:   Procedure Laterality Date    HYSTERECTOMY      KNEE SURGERY         Review of patient's allergies indicates:   Allergen Reactions    Latex, natural rubber            Current Outpatient Medications:     aspirin-calcium carbonate 81 mg-300 mg calcium(777 mg) Tab, Take 81 mg by mouth., Disp: , Rfl:     calcitRIOL (ROCALTROL) 0.5 MCG Cap, Take 0.5 mcg by mouth., Disp: , Rfl:     cyanocobalamin (VITAMIN B-12) 250 MCG tablet, Take 250 mcg by mouth., Disp: , Rfl:     escitalopram oxalate (LEXAPRO) 20 MG tablet, Take 20 mg by mouth., Disp: , Rfl:     levothyroxine (SYNTHROID) 137 MCG Tab tablet, , Disp: , Rfl: 1    levothyroxine (SYNTHROID) 150 MCG tablet, , Disp: , Rfl: 1    levothyroxine 137 mcg Cap, Take 1 capsule by mouth., Disp: , Rfl:     lisinopril (PRINIVIL,ZESTRIL) 5 MG tablet, Take 10 mg by mouth. , Disp: , Rfl:     metoprolol succinate (TOPROL-XL) 25 MG 24 hr tablet, Take 25 mg by mouth., Disp: , Rfl:     rivaroxaban (XARELTO) 20 mg Tab, Take 20 mg by mouth., Disp: , Rfl:     rosuvastatin (CRESTOR) 20 MG tablet, Take 20 mg by mouth., Disp: , Rfl:           Objective:   Vitals:  There  were no vitals taken for this visit.    Physical Examination:   GEN: no apparent distress, comfortable  HEAD: atraumatic and normocephalic  EYES: no pallor, no icterus  ENT:  no pharyngeal erythema, external ears WNL; no nasal discharge  NECK: no masses, thyroid normal, trachea midline, no LAD/LN's, supple  CV: RRR with no murmur; normal pulse; normal S1 and S2; no pedal edema  CHEST: Normal respiratory effort; CTAB; normal breath sounds; no wheeze or crackles  ABDOM: nontender and nondistended; soft; normal bowel sounds; no rebound/guarding, L/S NP  MUSC/Skeletal: She has right leg weakness.she is able to ambulate with a shuffling gait with the aid of a walker.  EXTREM: no clubbing, cyanosis, inflammation or swelling  SKIN: no rashes, lesions, ulcers, petechiae   : no cvat  NEURO: she has residual right-sided weakness.  She is able to ambulate with a shuffling gait, with the aid of a walker  PSYCH: normal mood, affect and behavior  LYMPH: normal cervical, supraclavicular, axillary and groin LN's    This physical examination is as per our last in office visit.  Labs:   Lab Results   Component Value Date    WBC 4.4 05/19/2020    HGB 11.3 (L) 05/19/2020    HCT 35.7 05/19/2020    MCV 80.6 05/19/2020     05/19/2020            Assessment:   (1) 59 y.o. female with diagnosis of right sided CVA, recurrent left leg DVT ×2, borderline low protein C with proteinuria and increased anti-cardiolipin antibodies. She has hypercoagulation syndrome. She appears stable on Xarelto 20 mg by mouth daily and aspirin 81 mg by mouth daily.  Return to clinic in 6 months.    (2)She. has history of iron deficiency, she is off iron now, at this time.  Iron stores are NL now.  Ferritin is 149.  Hemoglobin is 11.3 secondary to alpha-thalassemia.    (3)she has history of alpha thalassemia.    RTC 6 months.  CBC and ferritin at Dr. Tapia's office in 6 months.

## 2020-11-10 ENCOUNTER — TELEPHONE (OUTPATIENT)
Dept: HEMATOLOGY/ONCOLOGY | Facility: CLINIC | Age: 60
End: 2020-11-10

## 2020-11-10 NOTE — TELEPHONE ENCOUNTER
----- Message from Cher Dupree sent at 11/10/2020 10:40 AM CST -----  The patient called to cancel her appointment and also her mother Kamila Lazo's appointment. She said they do not want to see Dr. Mota anymore. She said they are both fine and do not need to see him. She said what she has is just a hereditary thing.

## 2024-10-09 ENCOUNTER — OFFICE VISIT (OUTPATIENT)
Dept: PODIATRY | Facility: CLINIC | Age: 64
End: 2024-10-09
Payer: MEDICARE

## 2024-10-09 VITALS — WEIGHT: 223.13 LBS | HEIGHT: 62 IN | BODY MASS INDEX: 41.06 KG/M2

## 2024-10-09 DIAGNOSIS — M79.674 CHRONIC TOE PAIN, RIGHT FOOT: ICD-10-CM

## 2024-10-09 DIAGNOSIS — L84 CORN OR CALLUS: ICD-10-CM

## 2024-10-09 DIAGNOSIS — M20.5X1 HALLUX EXTENSUS, ACQUIRED, RIGHT: ICD-10-CM

## 2024-10-09 DIAGNOSIS — M79.675 CHRONIC PAIN OF TOE OF LEFT FOOT: ICD-10-CM

## 2024-10-09 DIAGNOSIS — M21.6X2 EQUINUS DEFORMITY OF LEFT FOOT: ICD-10-CM

## 2024-10-09 DIAGNOSIS — G89.29 CHRONIC PAIN OF TOE OF LEFT FOOT: ICD-10-CM

## 2024-10-09 DIAGNOSIS — G89.29 CHRONIC TOE PAIN, RIGHT FOOT: ICD-10-CM

## 2024-10-09 DIAGNOSIS — M20.5X2 HALLUX EXTENSUS, ACQUIRED, LEFT: Primary | ICD-10-CM

## 2024-10-09 PROCEDURE — 11056 PARNG/CUTG B9 HYPRKR LES 2-4: CPT | Mod: PBBFAC,PN

## 2024-10-09 PROCEDURE — 99999 PR PBB SHADOW E&M-NEW PATIENT-LVL III: CPT | Mod: PBBFAC,,,

## 2024-10-09 PROCEDURE — 99203 OFFICE O/P NEW LOW 30 MIN: CPT | Mod: PBBFAC,PN

## 2024-10-09 RX ORDER — ATORVASTATIN CALCIUM 20 MG/1
TABLET, FILM COATED ORAL
COMMUNITY
Start: 2024-06-24

## 2024-10-09 NOTE — PROGRESS NOTES
"  [address] [facility] [address] [place] [address]  Phone: [phone]   [phone]    Patient's PCP:John Tapia MD  Referring Provider: Dr. John Tapia    Subjective:      Chief Complaint:: Toe Pain (Both big toes pressure like feeling)    Toe Pain   Pertinent negatives include no numbness.       Juana Lazo is a 64 y.o. female who presents today with a complaint of Both big toes pressure like feeling. The current episode started couple years. The symptoms include pressure like feeling. Probable cause of complaint unknown. The symptoms are aggravated by closed toed shoes. The problem has stayed the same. Treatment to date have included elevation which provided some relief. Painful was following Dr. Ro in [place] for Painful hyperkeratotic lesions both great toes, informed correction of hallux extensus deformity would require hallucal IPJ fusion, and that he deemed patient not a good candidate for surgery. She has no interest in surgical correction. She was told to Follow-up as needed and dispensed foot orthotics.     Vitals:    10/09/24 1122   Weight: 101.2 kg (223 lb 1.7 oz)   Height: 5' 2" (1.575 m)   PainSc:   6      Shoe Size: 8.5-9    Past Surgical History:   Procedure Laterality Date    HYSTERECTOMY      KNEE SURGERY       Past Medical History:   Diagnosis Date    Anemia     Stroke      No family history on file.     Social History:   Marital Status: Single  Alcohol History:  reports current alcohol use.  Tobacco History:  reports that she has never smoked. She has never used smokeless tobacco.  Drug History:  reports no history of drug use.    Review of patient's allergies indicates:   Allergen Reactions    Latex, natural rubber        Current Outpatient Medications   Medication Sig Dispense Refill    atorvastatin (LIPITOR) 20 MG tablet Take 1 tablet every day by oral route for 90 days.      calcium carbonate/vitamin D3 (CALCIUM 600 + D,3, ORAL)       calcitRIOL (ROCALTROL) 0.5 " MCG Cap Take 0.5 mcg by mouth.      levothyroxine (SYNTHROID) 137 MCG Tab tablet   1    lisinopril (PRINIVIL,ZESTRIL) 5 MG tablet Take 10 mg by mouth.       rivaroxaban (XARELTO) 20 mg Tab Take 20 mg by mouth.       No current facility-administered medications for this visit.       Review of Systems   Constitutional:  Negative for chills, fatigue, fever and unexpected weight change.   HENT:  Negative for hearing loss and trouble swallowing.    Eyes:  Negative for photophobia and visual disturbance.   Respiratory:  Negative for cough, shortness of breath and wheezing.    Cardiovascular:  Negative for chest pain, palpitations and leg swelling.   Gastrointestinal:  Negative for abdominal pain and nausea.   Genitourinary:  Negative for dysuria and frequency.   Musculoskeletal:  Negative for arthralgias, back pain, gait problem, joint swelling, myalgias and neck pain.   Skin:  Negative for rash and wound.   Neurological:  Negative for tremors, seizures, weakness, numbness and headaches.   Hematological:  Does not bruise/bleed easily.         Objective:        Physical Exam:   Foot Exam    General  General Appearance: appears stated age and healthy   Orientation: alert and oriented to person, place, and time   Affect: appropriate   Gait: unimpaired       Right Foot/Ankle     Inspection and Palpation  Ecchymosis: none  Tenderness: (Hallux dorsal IPJ)  Swelling: none   Arch: pes planus  Skin Exam: callus (Hallux IPJ); no drainage, no cellulitis, no ulcer and no erythema   Neurovascular  Dorsalis pedis: 2+  Posterior tibial: 2+  Capillary Refill: 3+  Saphenous nerve sensation: normal  Tibial nerve sensation: normal  Superficial peroneal nerve sensation: normal  Deep peroneal nerve sensation: normal  Sural nerve sensation: normal  Achilles reflex: 2+  Babinski reflex: 2+    Muscle Strength  Ankle dorsiflexion: 5  Ankle plantar flexion: 5  Ankle inversion: 5  Ankle eversion: 5  Great toe extension: 5  Great toe flexion:  5    Range of Motion    Passive  Ankle dorsiflexion: 5  1st IP extension: 0    Active  Ankle dorsiflexion: 5  Ankle eversion: 10  Ankle inversion: 20    Tests  Anterior drawer: negative   Varus tilt: negative   PT Tinel's sign: negative    Valleix sign: negative  Comments  Bilatearl ehl tendon contracture   With calluses sub PIPJs   Right AJ 5-10 degrees no pain no crepitation     Bilateral pes planus deformity with hallux extensus. Forefoot is abducted on the rear foot during gait.  Overpronation through mid stance.    Left Foot/Ankle      Inspection and Palpation  Ecchymosis: none  Tenderness: (Hallux dorsal IPJ)  Swelling: none   Arch: pes planus  Skin Exam: callus (Hallux IPJ); no drainage, no cellulitis, no ulcer and no erythema   Neurovascular  Dorsalis pedis: 2+  Posterior tibial: 2+  Capillary refill: 3+  Saphenous nerve sensation: normal  Tibial nerve sensation: normal  Superficial peroneal nerve sensation: normal  Deep peroneal nerve sensation: normal  Sural nerve sensation: normal  Achilles reflex: 2+  Babinski reflex: 2+    Muscle Strength  Ankle dorsiflexion: 5  Ankle plantar flexion: 5  Ankle inversion: 5  Ankle eversion: 5  Great toe extension: 5  Great toe flexion: 5    Range of Motion    Passive  1st IP extension: 0    Active  Ankle dorsiflexion: 0  Ankle eversion: 10  Ankle inversion: 20    Tests  Anterior drawer: negative   Varus Tilt: negative   PT Tinel's sign: negative  Valleix sign: negative  Comments  Bilatearl ehl tendon contracture   With calluses sub PIPJs   Left AJ restricted with crepritation no pain, mild enlargement of joint     Bilateral pes planus deformity with hallux extensus. Forefoot is abducted on the rear foot during gait.  Overpronation through mid stance.         Assessment:       1. Hallux extensus, acquired, left    2. Hallux extensus, acquired, right    3. Chronic toe pain, right foot    4. Chronic pain of toe of left foot    5. Corn or callus    6. Equinus deformity of  left foot      Plan:   Hallux extensus, acquired, left  -     Ambulatory referral/consult to Physical/Occupational Therapy    Hallux extensus, acquired, right  -     Ambulatory referral/consult to Physical/Occupational Therapy    Chronic toe pain, right foot    Chronic pain of toe of left foot    Corn or callus    Equinus deformity of left foot  -     Ambulatory referral/consult to Physical/Occupational Therapy      I provided patient education verbally regarding: Patient diagnosis, treatment options, as well as alternatives, risks, and benefits.   Bilateral hallux IPJ hyperkeratotic lesions debrided with #15 blade, no incident, tolerated well.   Physical therapy achilles complex stretches   Hayden manipulation of bilateral 1st IPJ Great toe contractures    Shoe gear modifications recommended   This note was created using Dragon voice recognition software that occasionally misinterpreted phrases or words.   Follow up JAIRO Del Rosario DPM  Podiatry / Foot and Ankle Surgery   Secure chat preferred